# Patient Record
Sex: MALE | Race: BLACK OR AFRICAN AMERICAN | NOT HISPANIC OR LATINO | Employment: FULL TIME | URBAN - METROPOLITAN AREA
[De-identification: names, ages, dates, MRNs, and addresses within clinical notes are randomized per-mention and may not be internally consistent; named-entity substitution may affect disease eponyms.]

---

## 2018-05-06 ENCOUNTER — HOSPITAL ENCOUNTER (EMERGENCY)
Facility: CLINIC | Age: 23
Discharge: HOME | End: 2018-05-06
Attending: EMERGENCY MEDICINE | Admitting: EMERGENCY MEDICINE
Payer: COMMERCIAL

## 2018-05-06 VITALS
DIASTOLIC BLOOD PRESSURE: 73 MMHG | RESPIRATION RATE: 16 BRPM | OXYGEN SATURATION: 98 % | HEIGHT: 69 IN | TEMPERATURE: 97.9 F | BODY MASS INDEX: 39.99 KG/M2 | WEIGHT: 270 LBS | SYSTOLIC BLOOD PRESSURE: 132 MMHG

## 2018-05-06 DIAGNOSIS — L05.01 PILONIDAL ABSCESS: ICD-10-CM

## 2018-05-06 PROCEDURE — 96361 HYDRATE IV INFUSION ADD-ON: CPT

## 2018-05-06 PROCEDURE — 99285 EMERGENCY DEPT VISIT HI MDM: CPT | Mod: 25

## 2018-05-06 PROCEDURE — 96375 TX/PRO/DX INJ NEW DRUG ADDON: CPT

## 2018-05-06 PROCEDURE — 96376 TX/PRO/DX INJ SAME DRUG ADON: CPT

## 2018-05-06 PROCEDURE — 10080 I&D PILONIDAL CYST SIMPLE: CPT

## 2018-05-06 PROCEDURE — 25000128 H RX IP 250 OP 636: Performed by: EMERGENCY MEDICINE

## 2018-05-06 PROCEDURE — 10060 I&D ABSCESS SIMPLE/SINGLE: CPT

## 2018-05-06 PROCEDURE — 96374 THER/PROPH/DIAG INJ IV PUSH: CPT | Mod: 59

## 2018-05-06 RX ORDER — FENTANYL CITRATE 50 UG/ML
50 INJECTION, SOLUTION INTRAMUSCULAR; INTRAVENOUS ONCE
Status: COMPLETED | OUTPATIENT
Start: 2018-05-06 | End: 2018-05-06

## 2018-05-06 RX ORDER — SODIUM CHLORIDE 9 MG/ML
INJECTION, SOLUTION INTRAVENOUS ONCE
Status: COMPLETED | OUTPATIENT
Start: 2018-05-06 | End: 2018-05-06

## 2018-05-06 RX ORDER — FENTANYL CITRATE 50 UG/ML
100 INJECTION, SOLUTION INTRAMUSCULAR; INTRAVENOUS ONCE
Status: COMPLETED | OUTPATIENT
Start: 2018-05-06 | End: 2018-05-06

## 2018-05-06 RX ORDER — FENTANYL CITRATE 50 UG/ML
INJECTION, SOLUTION INTRAMUSCULAR; INTRAVENOUS
Status: DISCONTINUED
Start: 2018-05-06 | End: 2018-05-06 | Stop reason: HOSPADM

## 2018-05-06 RX ORDER — HYDROCODONE BITARTRATE AND ACETAMINOPHEN 5; 325 MG/1; MG/1
1 TABLET ORAL EVERY 6 HOURS PRN
Qty: 12 TABLET | Refills: 0 | Status: SHIPPED | OUTPATIENT
Start: 2018-05-06 | End: 2024-03-08

## 2018-05-06 RX ADMIN — MIDAZOLAM HYDROCHLORIDE 1 MG: 1 INJECTION, SOLUTION INTRAMUSCULAR; INTRAVENOUS at 09:06

## 2018-05-06 RX ADMIN — FENTANYL CITRATE 50 MCG: 50 INJECTION, SOLUTION INTRAMUSCULAR; INTRAVENOUS at 09:39

## 2018-05-06 RX ADMIN — SODIUM CHLORIDE: 9 INJECTION, SOLUTION INTRAVENOUS at 09:00

## 2018-05-06 RX ADMIN — FENTANYL CITRATE 100 MCG: 50 INJECTION, SOLUTION INTRAMUSCULAR; INTRAVENOUS at 09:02

## 2018-05-06 ASSESSMENT — ENCOUNTER SYMPTOMS
FEVER: 0
WOUND: 1

## 2018-05-06 NOTE — ED AVS SNAPSHOT
Emergency Department    [address] [facility] [address]    Phone:  [phone]    Fax:  [phone]                                       Jasen Marshall   MRN: [id number]    Department:   Emergency Department   Date of Visit:  5/6/2018           After Visit Summary Signature Page     I have received my discharge instructions, and my questions have been answered. I have discussed any challenges I see with this plan with the nurse or doctor.    ..........................................................................................................................................  Patient/Patient Representative Signature      ..........................................................................................................................................  Patient Representative Print Name and Relationship to Patient    ..................................................               ................................................  Date                                            Time    ..........................................................................................................................................  Reviewed by Signature/Title    ...................................................              ..............................................  Date                                                            Time

## 2018-05-06 NOTE — DISCHARGE INSTRUCTIONS
Pilonidal Cyst    A pilonidal cyst is found near the base of the spine (tailbone) or top of the buttocks crease. It may look like a pit or small depression. In some cases, it may have a hollow tunnel (sinus tract) that connects it to the surface of the skin. Normally, a pilonidal cyst does not cause symptoms. But if it becomes infected, it can cause pain and swelling. This sheet tells you more about pilonidal cysts and how they are treated.  What causes a pilonidal cyst and who gets them?  Two main causes are:    Ingrown hairs. This happens when a hair is forced under the skin or when a hair follicle ruptures.    Injury to the area. This can happen from sitting for long periods of time.  These cysts are often diagnosed in people between ages 16 and 26. But people of any age can have a pilonidal cyst. They affect both men and women, but they are more common in men.  Symptoms of a pilonidal cyst infection  A pilonidal cyst does not cause symptoms unless it becomes infected. Once a pilonidal cyst becomes infected, it is called a pilonidal abscess. Infection may cause the following symptoms:    Pain, redness, and swelling of the cyst and area around it    Foul-smelling drainage from the cyst    Fever  Diagnosing a pilonidal cyst  A pilonidal cyst can be diagnosed by how it looks and by its location. Your healthcare provider will examine the suspected cyst to confirm a diagnosis. You will be told if any tests are needed.  Treating a pilonidal cyst infection  Most pilonidal cysts are left alone. But if a cyst becomes infected, treatment is needed. It may include the following:    Incision and drainage. If needed, the cyst is cut open, and pus and other infected material is allowed to drain.    Antibiotic medicines for the infection. Know that medicines do not make the cyst go away, and antibiotics have limited use in treating an abscess. They also won t keep a cyst from becoming infected again.    Hot water soaks. These  can help draw out the infection and ease pain and itching.    Surgery to remove the cyst (excision). This may be done if the infection is severe, does not respond to medicine, or keeps coming back. A surgeon cuts and removes the cyst and the tissue around it. Your healthcare provider can tell you more if this is needed.    Laser hair removalaround the area. This may decrease the frequency of flare-ups.  Preventing infection  A pilonidal cyst can easily become infected. Do the following to help prevent infections:    Keep the cyst and surrounding skin area clean.    Remove hair from the area of the cyst regularly. Ask your healthcare provider about safe hair removal products or procedures.    Avoid sitting in one position for long periods of time. This helps to reduce weight and pressure on your tailbone area. Sitting on a special cushion to relieve pressure on the tailbone may also help. Ask your healthcare provider about where to purchase these cushions.    Avoid tight-fitting clothing to reduce skin irritation around the cyst.  Date Last Reviewed: 2/1/2017 2000-2017 [employer]. [address] [place] [address]. All rights reserved. This information is not intended as a substitute for professional medical care. Always follow your healthcare professional's instructions.          Infected Pilonidal Cyst (Incision & Drainage)  A pilonidal cyst is a swelling that starts under the skin on the sacrum near the tail bone. It may look like a small dimple. It can fill with skin oils, hair, and dead skin cells. It may stay small or grow larger. Because it often has an opening to the surface, it may become infected with normal skin bacteria.  Cause  The cause of pilonidal cysts has been debated since they were first recognized. It may be present at birth and go unnoticed. Injury, rubbing, or skin irritation may also cause pilonidal cysts. It can also be caused by an ingrown hair. Most likely, the  cause is a combination of these things. Because some injury or irritation can lead to pilonidal cysts, it can be more common in people who sit or drive a lot for work.  Symptoms  A pilonidal cyst may be small and painless. If it is inflamed or infected, you may have these symptoms:    Swelling    Irritation or redness    Pain    Drainage  The cyst can swell and drain on its own. The swelling and drainage can come and go.  Treatment  Your pilonidal cyst was drained with a small incision using local anesthesia.  After the incision and drainage, gauze packing may be inserted into the opening. If so, it should be removed in 1 to 2 days. Antibiotics are not required in the treatment of a simple abscess, unless the infection is spreading into the skin around the wound. The wound will take about 1 to 2 weeks to heal depending on the size of the cyst.  Home care  Wound care    Pus may drain from the wound for the first few days. Cover the wound with a clean dry bandage. Change the bandage if it becomes soaked with blood or pus, or if it gets soiled with feces or urine.    Sit in a tub filled with about 6 inches of hot water. Keep the water hot for 10 to15 minutes.    If gauze packing was placed inside the cyst cavity, you may be told to remove it yourself. You may do this in the shower. Once the packing is removed, you should wash the area carefully in the shower once a day. Do this until the skin opening has closed. It is okay to direct the shower spray directly into the opening if this is not too painful.  Medicines    Take acetaminophen or ibuprofen for pain, unless you were given a different pain medicine to use. Talk with your doctor before using these medicines if you have chronic liver or kidney disease or have ever had a stomach ulcer or gastrointestinal bleeding. Also talk with your doctor if you are taking blood thinner medicines.    If you were given antibiotics, take them until they are gone. It is important to  finish the antibiotics even if the wound looks better. This is to make sure the infection has cleared.    Use antibiotic cream or ointment if your healthcare provider tells you to do so.  Prevention  Once this infection has healed, the following may decrease the risk of future infections:    Keep the area of the cyst clean by bathing or showering daily.    Avoid tight-fitting clothing to minimize perspiration and irritation of the skin.    Recurrent pilonidal cysts may be completely removed by surgery. But this can only be done at a time when there is no infection. Ask your doctor for more information.  Follow-up care  Follow up with your healthcare provider, or as advised. If a gauze packing was inserted in your wound, it should be removed in 1 to 2 days. Check your wound every day for the signs listed below.  When to seek medical advice  Call your healthcare provider right away if any of these occur:    Pus continues to come from the cyst for 5 days after the incision    Increasing redness, local pain, or swelling    Fever of 100.4 F (38.0 C) or higher for more than 2 days, or as directed by your healthcare provider  Date Last Reviewed: 12/1/2016 2000-2017 [employer]. [address] [place] [address]. All rights reserved. This information is not intended as a substitute for professional medical care. Always follow your healthcare professional's instructions.          Pilonidal Cyst, Infected (Antibiotic Treatment)  A pilonidal cyst is a swelling that starts under the skin on the sacrum near the tailbone. It may look like a small dimple. It can fill with skin oils, hair, and dead skin cells. It may stay small or grow larger. It may become infected with normal skin bacteria because it often has an opening to the surface.  Causes  The cause of pilonidal cysts has been debated since they were first recognized. A cyst may be present at birth and go unnoticed. Injury, rubbing, or skin  irritation may also cause pilonidal cysts. It can also be caused by an ingrown hair. The cause is most likely a combination of these things. Because some injury or irritation can lead to pilonidal cysts, they can be more common in people who sit or drive a lot for work.  Symptoms  A pilonidal cyst may be small and painless. If it becomes inflamed or infected, you may have these symptoms:    Swelling    Irritation or redness    Pain    Drainage  The cyst can swell and drain on its own. The swelling and drainage can come and go.  Treatment  A limited infection can be treated with antibiotics and home care. You have been given antibiotics to treat your infected pilonidal cyst.  Home care  The following guidelines will help you care for your wound at home:    Sit in a tub filled with about 6 inches of hot water. Keep the water hot for 10 to 15 minutes.    Don't squeeze the pilonidal cyst or stick a needle in it to drain it. This will make the infection worse, or spread it.    Cover the cyst with a pad or something similar to keep it from becoming more irritated, damaged, and painful.  Medicines    Take acetaminophen or ibuprofen for pain, unless you were given a different pain medicine to use. Talk with your doctor before using these medicines if you have chronic liver or kidney disease, or have ever had a stomach ulcer or digestive bleeding. Also talk with your doctor if you are taking blood thinner medicines.    Take the antibiotics that you were prescribed until they are all gone. To make sure the infection is cured, it is important to finish the antibiotics even if the wound looks better.    Use antibiotic cream or ointment if your healthcare provider tells you to.    Preventing future infections  Once this infection has healed, follow these tips to lower the risk for another infection:    Keep the area of the cyst clean by bathing or showering every day.    Don't wear tight-fitting clothing. This will help lessen  sweat and irritation of the skin.    You may need surgery to completely remove the cyst if it keeps coming back. The surgery can only be done when the cyst is not infected. Ask your doctor for more information.    Watch for signs of infection listed below so that treatment may be started early.  Follow-up care  Follow up with your healthcare provider, or as advised. Check your wound every day for the signs listed below.  When to seek medical advice  Call your healthcare provider right away if any of these occur:    Pus coming from the cyst    Increasing local pain, redness, or swelling    Fever of 100.4 F (38.0 C) or higher for more than 2 days, or as directed by your healthcare provider  Date Last Reviewed: 12/1/2016 2000-2017 [employer]. [address] [place] [address], [place] [address]. All rights reserved. This information is not intended as a substitute for professional medical care. Always follow your healthcare professional's instructions.            Please see general surgery this week for consultation  Work today or tomorrow

## 2018-05-06 NOTE — LETTER
May 6, 2018      To Whom It May Concern:      Jasen Marshall was seen in our Emergency Department today, 05/06/18.  I expect his condition to improve over the next few days.  He may return to work/school when improved. May 8    Sincerely,        Boom Cuenca MD

## 2018-05-06 NOTE — ED PROVIDER NOTES
"  History     Chief Complaint:  Wound Check     HPI   Jsaen Marshall is a 22 year old male who presents to the emergency department today for evaluation of a wound check. The patient reports six days ago he reports he developed a small bump and pain between his tailbone and buttocks. Over the next few days, the area got larger and the pain worsened. He has his grandmother look at it two days ago, but she just \"put a band aid on it.\" However, the pain has been worsening over the past two days. He tried using a heating pad and the area \"burst open.\" His father then squeezed the area and noted dark colored blood. The patient \"couldn't handle the pain anymore,\" therefore, prompting the visit to the ED for further evaluation. Upon presentation, patient states he had a similar abscess about six years ago in which his grandmother squeezed with improvement and did not see a doctor. The patient has no other concerns at this time.     Allergies:  No Known Drug Allergies     Medications:    raltegravir (ISENTRESS) 400 MG tablet  emtricitabine-tenofovir (TRUVADA) 200-300 MG per tablet    Past Medical History:    History reviewed. No pertinent past medical history.    Past Surgical History:    History reviewed. No pertinent surgical history.    Family History:    History reviewed. No pertinent family history.     Social History:  The patient was accompanied to the ED by family.  Smoking Status: Never  Smokeless Tobacco: Never  Alcohol Use: Yes  Marital Status:  Single      Review of Systems   Constitutional: Negative for fever.   Musculoskeletal:        Tailbone pain   Skin: Positive for wound (pilonidal).   All other systems reviewed and are negative.    Physical Exam   First Vitals:  BP: 143/82  Heart Rate: 89  Temp: 97.9  F (36.6  C)  Resp: 16  Height: 175.3 cm (5' 9\")  Weight: 122.5 kg (270 lb)  SpO2: 96 %    Physical Exam  General: Resting comfortably on the gurney  Head:  The scalp, face, and head appear normal  Eyes:  The " pupils are normal    Conjunctivae and sclera appear normal  ENT:    The nose is normal    Ears/pinnae are normal  Neck:  Normal range of motion  Skin:  No rash or lesions noted.    There is a pilonidal cyst abscess in the superior gluteal cleft    Worse on the right than left    There is a focal area that is currently draining serosanguinous discharge   Neuro: Speech is normal and fluent  Psych:  Awake. Alert.  Normal affect.      Appropriate interactions    Emergency Department Course     Procedures:    Procedure: Incision and Drainage     LOCATIONS:  Pilonidal     ANESTHESIA:  Local field block using Marcaine 0.5% with epinephrine, total of 10 mLs    PREPARATION:  Cleansed with Betadine    PROCEDURE:  Area was incised with # 11 Blade (Sharp Point) with a Single Straight incision.  Wound treatment included Deloculation, Purulent Drainage and stretching.  Packing consisted of new gauze.  Appropriate dressing was applied to cover the area.    Patient Status: Patient tolerated the procedure well. There were no complications.    Interventions:  0900 NS Infusion 125mL/hr IV  0902 Fentanyl 100mcg IV  0906 Versed 1mg IV  0939 Fentanyl 50mcg IV     Emergency Department Course:  Nursing notes and vitals reviewed.  0847: I performed an exam of the patient as documented above.   0933: Patient rechecked and above procedure performed.   Findings and plan explained to the Patient. Patient discharged home with instructions regarding supportive care, medications, and reasons to return. The importance of close follow-up was reviewed. The patient was prescribed Augmentin and norco.     Impression & Plan      Medical Decision Making:  As patient presents with increasing pain in the superior gluteal cleft consistent with a pilonidal cyst abscess right greater than left.  It underwent incision and drainage there was some infection of the cyst.  There is no surrounding cellulitis.  Incision and drainage was performed and this did reveal  a significant cyst cavity.  Packing was performed.  Formal surgical excision will be required since this is the second time this is occurred.    Diagnosis:    ICD-10-CM    1. Pilonidal abscess L05.01      Disposition:  Discharged to home    Discharge Medications:  New Prescriptions    AMOXICILLIN-CLAVULANATE (AUGMENTIN) 875-125 MG PER TABLET    Take 1 tablet by mouth 2 times daily for 10 days    HYDROCODONE-ACETAMINOPHEN (NORCO) 5-325 MG PER TABLET    Take 1 tablet by mouth every 6 hours as needed for pain maximum 10 tablet(s) per day       Scribe Disclosure:  Ginette CUMMINS, am serving as a scribe at 8:38 AM on 5/6/2018 to document services personally performed by Boom Cuenca MD based on my observations and the provider's statements to me.     5/6/2018    EMERGENCY DEPARTMENT       Boom Cuenca MD  05/06/18 1400

## 2018-05-06 NOTE — ED AVS SNAPSHOT
Emergency Department    [address] [place] [address]    Phone:  [phone]    Fax:  [phone]                                       Jasen Marshall   MRN: [id number]    Department:   Emergency Department   Date of Visit:  5/6/2018           Patient Information     Date Of Birth          1995        Your diagnoses for this visit were:     Pilonidal abscess        You were seen by Boom Cuenca MD.      Follow-up Information     Follow up with Boom Mercer MD. Schedule an appointment as soon as possible for a visit in 1 week.    Specialty:  Surgery    Contact information:    [address] [place] [address]  [phone]          Discharge Instructions         Pilonidal Cyst    A pilonidal cyst is found near the base of the spine (tailbone) or top of the buttocks crease. It may look like a pit or small depression. In some cases, it may have a hollow tunnel (sinus tract) that connects it to the surface of the skin. Normally, a pilonidal cyst does not cause symptoms. But if it becomes infected, it can cause pain and swelling. This sheet tells you more about pilonidal cysts and how they are treated.  What causes a pilonidal cyst and who gets them?  Two main causes are:    Ingrown hairs. This happens when a hair is forced under the skin or when a hair follicle ruptures.    Injury to the area. This can happen from sitting for long periods of time.  These cysts are often diagnosed in people between ages 16 and 26. But people of any age can have a pilonidal cyst. They affect both men and women, but they are more common in men.  Symptoms of a pilonidal cyst infection  A pilonidal cyst does not cause symptoms unless it becomes infected. Once a pilonidal cyst becomes infected, it is called a pilonidal abscess. Infection may cause the following symptoms:    Pain, redness, and swelling of the cyst and area around it    Foul-smelling drainage from the cyst    Fever  Diagnosing a  pilonidal cyst  A pilonidal cyst can be diagnosed by how it looks and by its location. Your healthcare provider will examine the suspected cyst to confirm a diagnosis. You will be told if any tests are needed.  Treating a pilonidal cyst infection  Most pilonidal cysts are left alone. But if a cyst becomes infected, treatment is needed. It may include the following:    Incision and drainage. If needed, the cyst is cut open, and pus and other infected material is allowed to drain.    Antibiotic medicines for the infection. Know that medicines do not make the cyst go away, and antibiotics have limited use in treating an abscess. They also won t keep a cyst from becoming infected again.    Hot water soaks. These can help draw out the infection and ease pain and itching.    Surgery to remove the cyst (excision). This may be done if the infection is severe, does not respond to medicine, or keeps coming back. A surgeon cuts and removes the cyst and the tissue around it. Your healthcare provider can tell you more if this is needed.    Laser hair removalaround the area. This may decrease the frequency of flare-ups.  Preventing infection  A pilonidal cyst can easily become infected. Do the following to help prevent infections:    Keep the cyst and surrounding skin area clean.    Remove hair from the area of the cyst regularly. Ask your healthcare provider about safe hair removal products or procedures.    Avoid sitting in one position for long periods of time. This helps to reduce weight and pressure on your tailbone area. Sitting on a special cushion to relieve pressure on the tailbone may also help. Ask your healthcare provider about where to purchase these cushions.    Avoid tight-fitting clothing to reduce skin irritation around the cyst.  Date Last Reviewed: 2/1/2017 2000-2017 [employer]. [address] [place] [address], [place] [address]. All rights reserved. This information is not intended as a substitute for  professional medical care. Always follow your healthcare professional's instructions.          Infected Pilonidal Cyst (Incision & Drainage)  A pilonidal cyst is a swelling that starts under the skin on the sacrum near the tail bone. It may look like a small dimple. It can fill with skin oils, hair, and dead skin cells. It may stay small or grow larger. Because it often has an opening to the surface, it may become infected with normal skin bacteria.  Cause  The cause of pilonidal cysts has been debated since they were first recognized. It may be present at birth and go unnoticed. Injury, rubbing, or skin irritation may also cause pilonidal cysts. It can also be caused by an ingrown hair. Most likely, the cause is a combination of these things. Because some injury or irritation can lead to pilonidal cysts, it can be more common in people who sit or drive a lot for work.  Symptoms  A pilonidal cyst may be small and painless. If it is inflamed or infected, you may have these symptoms:    Swelling    Irritation or redness    Pain    Drainage  The cyst can swell and drain on its own. The swelling and drainage can come and go.  Treatment  Your pilonidal cyst was drained with a small incision using local anesthesia.  After the incision and drainage, gauze packing may be inserted into the opening. If so, it should be removed in 1 to 2 days. Antibiotics are not required in the treatment of a simple abscess, unless the infection is spreading into the skin around the wound. The wound will take about 1 to 2 weeks to heal depending on the size of the cyst.  Home care  Wound care    Pus may drain from the wound for the first few days. Cover the wound with a clean dry bandage. Change the bandage if it becomes soaked with blood or pus, or if it gets soiled with feces or urine.    Sit in a tub filled with about 6 inches of hot water. Keep the water hot for 10 to15 minutes.    If gauze packing was placed inside the cyst cavity, you  may be told to remove it yourself. You may do this in the shower. Once the packing is removed, you should wash the area carefully in the shower once a day. Do this until the skin opening has closed. It is okay to direct the shower spray directly into the opening if this is not too painful.  Medicines    Take acetaminophen or ibuprofen for pain, unless you were given a different pain medicine to use. Talk with your doctor before using these medicines if you have chronic liver or kidney disease or have ever had a stomach ulcer or gastrointestinal bleeding. Also talk with your doctor if you are taking blood thinner medicines.    If you were given antibiotics, take them until they are gone. It is important to finish the antibiotics even if the wound looks better. This is to make sure the infection has cleared.    Use antibiotic cream or ointment if your healthcare provider tells you to do so.  Prevention  Once this infection has healed, the following may decrease the risk of future infections:    Keep the area of the cyst clean by bathing or showering daily.    Avoid tight-fitting clothing to minimize perspiration and irritation of the skin.    Recurrent pilonidal cysts may be completely removed by surgery. But this can only be done at a time when there is no infection. Ask your doctor for more information.  Follow-up care  Follow up with your healthcare provider, or as advised. If a gauze packing was inserted in your wound, it should be removed in 1 to 2 days. Check your wound every day for the signs listed below.  When to seek medical advice  Call your healthcare provider right away if any of these occur:    Pus continues to come from the cyst for 5 days after the incision    Increasing redness, local pain, or swelling    Fever of 100.4 F (38.0 C) or higher for more than 2 days, or as directed by your healthcare provider  Date Last Reviewed: 12/1/2016 2000-2017 [employer]. [address] [facility],  [place] [address]. All rights reserved. This information is not intended as a substitute for professional medical care. Always follow your healthcare professional's instructions.          Pilonidal Cyst, Infected (Antibiotic Treatment)  A pilonidal cyst is a swelling that starts under the skin on the sacrum near the tailbone. It may look like a small dimple. It can fill with skin oils, hair, and dead skin cells. It may stay small or grow larger. It may become infected with normal skin bacteria because it often has an opening to the surface.  Causes  The cause of pilonidal cysts has been debated since they were first recognized. A cyst may be present at birth and go unnoticed. Injury, rubbing, or skin irritation may also cause pilonidal cysts. It can also be caused by an ingrown hair. The cause is most likely a combination of these things. Because some injury or irritation can lead to pilonidal cysts, they can be more common in people who sit or drive a lot for work.  Symptoms  A pilonidal cyst may be small and painless. If it becomes inflamed or infected, you may have these symptoms:    Swelling    Irritation or redness    Pain    Drainage  The cyst can swell and drain on its own. The swelling and drainage can come and go.  Treatment  A limited infection can be treated with antibiotics and home care. You have been given antibiotics to treat your infected pilonidal cyst.  Home care  The following guidelines will help you care for your wound at home:    Sit in a tub filled with about 6 inches of hot water. Keep the water hot for 10 to 15 minutes.    Don't squeeze the pilonidal cyst or stick a needle in it to drain it. This will make the infection worse, or spread it.    Cover the cyst with a pad or something similar to keep it from becoming more irritated, damaged, and painful.  Medicines    Take acetaminophen or ibuprofen for pain, unless you were given a different pain medicine to use. Talk with your doctor before  using these medicines if you have chronic liver or kidney disease, or have ever had a stomach ulcer or digestive bleeding. Also talk with your doctor if you are taking blood thinner medicines.    Take the antibiotics that you were prescribed until they are all gone. To make sure the infection is cured, it is important to finish the antibiotics even if the wound looks better.    Use antibiotic cream or ointment if your healthcare provider tells you to.    Preventing future infections  Once this infection has healed, follow these tips to lower the risk for another infection:    Keep the area of the cyst clean by bathing or showering every day.    Don't wear tight-fitting clothing. This will help lessen sweat and irritation of the skin.    You may need surgery to completely remove the cyst if it keeps coming back. The surgery can only be done when the cyst is not infected. Ask your doctor for more information.    Watch for signs of infection listed below so that treatment may be started early.  Follow-up care  Follow up with your healthcare provider, or as advised. Check your wound every day for the signs listed below.  When to seek medical advice  Call your healthcare provider right away if any of these occur:    Pus coming from the cyst    Increasing local pain, redness, or swelling    Fever of 100.4 F (38.0 C) or higher for more than 2 days, or as directed by your healthcare provider  Date Last Reviewed: 12/1/2016 2000-2017 [employer]. [address] [place] [address], [place] [id number]. All rights reserved. This information is not intended as a substitute for professional medical care. Always follow your healthcare professional's instructions.            Please see general surgery this week for consultation  Work today or tomorrow    24 Hour Appointment Hotline       To make an appointment at any [facility], call [phone] ([phone]). If you don't have a family doctor or clinic, we will help  you find one. [facility] are conveniently located to serve the needs of you and your family.             Review of your medicines      START taking        Dose / Directions Last dose taken    amoxicillin-clavulanate 875-125 MG per tablet   Commonly known as:  AUGMENTIN   Dose:  1 tablet   Quantity:  20 tablet        Take 1 tablet by mouth 2 times daily for 10 days   Refills:  0        HYDROcodone-acetaminophen 5-325 MG per tablet   Commonly known as:  NORCO   Dose:  1 tablet   Quantity:  12 tablet        Take 1 tablet by mouth every 6 hours as needed for pain maximum 10 tablet(s) per day   Refills:  0          Our records show that you are taking the medicines listed below. If these are incorrect, please call your family doctor or clinic.        Dose / Directions Last dose taken    ADVIL PO   Dose:  400 mg        Take 400 mg by mouth   Refills:  0        emtricitabine-tenofovir 200-300 MG per tablet   Commonly known as:  TRUVADA   Dose:  1 tablet   Quantity:  28 tablet        Take 1 tablet by mouth daily for 28 days   Refills:  0        raltegravir 400 MG tablet   Commonly known as:  ISENTRESS   Dose:  400 mg   Quantity:  56 tablet        Take 1 tablet (400 mg) by mouth 2 times daily for 28 days   Refills:  0        UNABLE TO FIND        MEDICATION NAME: pepto bismal   Refills:  0                Information about OPIOIDS     PRESCRIPTION OPIOIDS: WHAT YOU NEED TO KNOW   You have a prescription for an opioid (narcotic) pain medicine. Opioids can cause addiction. If you have a history of chemical dependency of any type, you are at a higher risk of becoming addicted to opioids. Only take this medicine after all other options have been tried. Take it for as short a time and as few doses as possible.     Do not:    Drive. If you drive while taking these medicines, you could be arrested for driving under the influence (DUI).    Operate heavy machinery    Do any other dangerous activities while taking these  medicines.     Drink any alcohol while taking these medicines.      Take with any other medicines that contain acetaminophen. Read all labels carefully. Look for the word  acetaminophen  or  Tylenol.  Ask your pharmacist if you have questions or are unsure.    Store your pills in a secure place, locked if possible. We will not replace any lost or stolen medicine. If you don t finish your medicine, please throw away (dispose) as directed by your pharmacist. The [place] [employer] has more information about safe disposal: https://www.pca.state.[place]/living-green/managing-unwanted-medications    All opioids tend to cause constipation. Drink plenty of water and eat foods that have a lot of fiber, such as fruits, vegetables, prune juice, apple juice and high-fiber cereal. Take a laxative (Miralax, milk of magnesia, Colace, Senna) if you don t move your bowels at least every other day.         Prescriptions were sent or printed at these locations (2 Prescriptions)                   Other Prescriptions                Printed at Department/Unit printer (2 of 2)         amoxicillin-clavulanate (AUGMENTIN) 875-125 MG per tablet               HYDROcodone-acetaminophen (NORCO) 5-325 MG per tablet                Orders Needing Specimen Collection     None      Pending Results     No orders found from 5/4/2018 to 5/7/2018.            Pending Culture Results     No orders found from 5/4/2018 to 5/7/2018.            Pending Results Instructions     If you had any lab results that were not finalized at the time of your Discharge, you can call the ED Lab Result RN at [phone]. You will be contacted by this team for any positive Lab results or changes in treatment. The nurses are available 7 days a week from 10A to 6:30P.  You can leave a message 24 hours per day and they will return your call.        Test Results From Your Hospital Stay               Clinical Quality Measure: Blood Pressure Screening     Your  "blood pressure was checked while you were in the emergency department today. The last reading we obtained was  BP: 109/65 . Please read the guidelines below about what these numbers mean and what you should do about them.  If your systolic blood pressure (the top number) is less than 120 and your diastolic blood pressure (the bottom number) is less than 80, then your blood pressure is normal. There is nothing more that you need to do about it.  If your systolic blood pressure (the top number) is 120-139 or your diastolic blood pressure (the bottom number) is 80-89, your blood pressure may be higher than it should be. You should have your blood pressure rechecked within a year by a primary care provider.  If your systolic blood pressure (the top number) is 140 or greater or your diastolic blood pressure (the bottom number) is 90 or greater, you may have high blood pressure. High blood pressure is treatable, but if left untreated over time it can put you at risk for heart attack, stroke, or kidney failure. You should have your blood pressure rechecked by a primary care provider within the next 4 weeks.  If your provider in the emergency department today gave you specific instructions to follow-up with your doctor or provider even sooner than that, you should follow that instruction and not wait for up to 4 weeks for your follow-up visit.        Thank you for choosing [facility]       Thank you for choosing [facility] for your care. Our goal is always to provide you with excellent care. Hearing back from our patients is one way we can continue to improve our services. Please take a few minutes to complete the written survey that you may receive in the mail after you visit with us. Thank you!        [employer] Information     [employer] lets you send messages to your doctor, view your test results, renew your prescriptions, schedule appointments and more. To sign up, go to [email or web address] . Click on \"Log in\" on the left " "side of the screen, which will take you to the Welcome page. Then click on \"Sign up Now\" on the right side of the page.     You will be asked to enter the access code listed below, as well as some personal information. Please follow the directions to create your username and password.     Your access code is: [email or web address]  Expires: 2018 10:06 AM     Your access code will  in 90 days. If you need help or a new code, please call your [facility] clinic or [phone].        Care EveryWhere ID     This is your Care EveryWhere ID. This could be used by other organizations to access your [facility] medical records  [id number]        Equal Access to Services     DAVID FOURNIER : Rashida Li, tavo barcenas, hany pritchett, minh her. So [facility] [phone].    ATENCIÓN: Si habla español, tiene a chapman disposición servicios gratuitos de asistencia lingüística. Llame al [phone].    We comply with applicable federal civil rights laws and [place] laws. We do not discriminate on the basis of race, color, national origin, age, disability, sex, sexual orientation, or gender identity.            After Visit Summary       This is your record. Keep this with you and show to your community pharmacist(s) and doctor(s) at your next visit.                  "

## 2018-05-10 ENCOUNTER — HOSPITAL ENCOUNTER (EMERGENCY)
Facility: CLINIC | Age: 23
Discharge: HOME | End: 2018-05-10
Attending: EMERGENCY MEDICINE | Admitting: EMERGENCY MEDICINE
Payer: COMMERCIAL

## 2018-05-10 VITALS
OXYGEN SATURATION: 99 % | TEMPERATURE: 98.2 F | HEIGHT: 69 IN | SYSTOLIC BLOOD PRESSURE: 122 MMHG | RESPIRATION RATE: 16 BRPM | BODY MASS INDEX: 37.59 KG/M2 | HEART RATE: 87 BPM | WEIGHT: 253.8 LBS | DIASTOLIC BLOOD PRESSURE: 63 MMHG

## 2018-05-10 DIAGNOSIS — R73.9 HYPERGLYCEMIA: ICD-10-CM

## 2018-05-10 LAB
ALBUMIN UR-MCNC: 30 MG/DL
ANION GAP SERPL CALCULATED.3IONS-SCNC: 9 MMOL/L (ref 3–14)
APPEARANCE UR: CLEAR
BASOPHILS # BLD AUTO: 0 10E9/L (ref 0–0.2)
BASOPHILS NFR BLD AUTO: 0.2 %
BILIRUB UR QL STRIP: NEGATIVE
BUN SERPL-MCNC: 14 MG/DL (ref 7–30)
CALCIUM SERPL-MCNC: 10 MG/DL (ref 8.5–10.1)
CHLORIDE SERPL-SCNC: 97 MMOL/L (ref 94–109)
CO2 SERPL-SCNC: 27 MMOL/L (ref 20–32)
COLOR UR AUTO: ABNORMAL
CREAT SERPL-MCNC: 0.72 MG/DL (ref 0.66–1.25)
DIFFERENTIAL METHOD BLD: NORMAL
EOSINOPHIL # BLD AUTO: 0.3 10E9/L (ref 0–0.7)
EOSINOPHIL NFR BLD AUTO: 3.5 %
ERYTHROCYTE [DISTWIDTH] IN BLOOD BY AUTOMATED COUNT: 12 % (ref 10–15)
GFR SERPL CREATININE-BSD FRML MDRD: >90 ML/MIN/1.7M2
GLUCOSE BLDC GLUCOMTR-MCNC: 413 MG/DL (ref 70–99)
GLUCOSE BLDC GLUCOMTR-MCNC: 464 MG/DL (ref 70–99)
GLUCOSE SERPL-MCNC: 499 MG/DL (ref 70–99)
GLUCOSE UR STRIP-MCNC: >1000 MG/DL
HBA1C MFR BLD: 13.1 % (ref 0–5.6)
HCT VFR BLD AUTO: 42.8 % (ref 40–53)
HGB BLD-MCNC: 15.5 G/DL (ref 13.3–17.7)
HGB UR QL STRIP: NEGATIVE
IMM GRANULOCYTES # BLD: 0.1 10E9/L (ref 0–0.4)
IMM GRANULOCYTES NFR BLD: 0.6 %
KETONES UR STRIP-MCNC: 40 MG/DL
LEUKOCYTE ESTERASE UR QL STRIP: NEGATIVE
LYMPHOCYTES # BLD AUTO: 3.7 10E9/L (ref 0.8–5.3)
LYMPHOCYTES NFR BLD AUTO: 43.1 %
MCH RBC QN AUTO: 30.8 PG (ref 26.5–33)
MCHC RBC AUTO-ENTMCNC: 36.2 G/DL (ref 31.5–36.5)
MCV RBC AUTO: 85 FL (ref 78–100)
MONOCYTES # BLD AUTO: 0.8 10E9/L (ref 0–1.3)
MONOCYTES NFR BLD AUTO: 9.5 %
MUCOUS THREADS #/AREA URNS LPF: PRESENT /LPF
NEUTROPHILS # BLD AUTO: 3.7 10E9/L (ref 1.6–8.3)
NEUTROPHILS NFR BLD AUTO: 43.1 %
NITRATE UR QL: NEGATIVE
NRBC # BLD AUTO: 0 10*3/UL
NRBC BLD AUTO-RTO: 0 /100
PH UR STRIP: 5 PH (ref 5–7)
PLATELET # BLD AUTO: 238 10E9/L (ref 150–450)
POTASSIUM SERPL-SCNC: 4.3 MMOL/L (ref 3.4–5.3)
RBC # BLD AUTO: 5.03 10E12/L (ref 4.4–5.9)
RBC #/AREA URNS AUTO: <1 /HPF (ref 0–2)
SODIUM SERPL-SCNC: 133 MMOL/L (ref 133–144)
SOURCE: ABNORMAL
SP GR UR STRIP: 1.03 (ref 1–1.03)
UROBILINOGEN UR STRIP-MCNC: NORMAL MG/DL (ref 0–2)
WBC # BLD AUTO: 8.5 10E9/L (ref 4–11)
WBC #/AREA URNS AUTO: 1 /HPF (ref 0–5)

## 2018-05-10 PROCEDURE — 96361 HYDRATE IV INFUSION ADD-ON: CPT

## 2018-05-10 PROCEDURE — 25000132 ZZH RX MED GY IP 250 OP 250 PS 637: Performed by: PHYSICIAN ASSISTANT

## 2018-05-10 PROCEDURE — 00000146 ZZHCL STATISTIC GLUCOSE BY METER IP

## 2018-05-10 PROCEDURE — 25000128 H RX IP 250 OP 636: Performed by: PHYSICIAN ASSISTANT

## 2018-05-10 PROCEDURE — 25000131 ZZH RX MED GY IP 250 OP 636 PS 637: Performed by: PHYSICIAN ASSISTANT

## 2018-05-10 PROCEDURE — 96374 THER/PROPH/DIAG INJ IV PUSH: CPT

## 2018-05-10 PROCEDURE — 83036 HEMOGLOBIN GLYCOSYLATED A1C: CPT | Performed by: PHYSICIAN ASSISTANT

## 2018-05-10 PROCEDURE — 81001 URINALYSIS AUTO W/SCOPE: CPT | Performed by: PHYSICIAN ASSISTANT

## 2018-05-10 PROCEDURE — 80048 BASIC METABOLIC PNL TOTAL CA: CPT | Performed by: PHYSICIAN ASSISTANT

## 2018-05-10 PROCEDURE — 85025 COMPLETE CBC W/AUTO DIFF WBC: CPT | Performed by: PHYSICIAN ASSISTANT

## 2018-05-10 PROCEDURE — 99284 EMERGENCY DEPT VISIT MOD MDM: CPT | Mod: 25

## 2018-05-10 RX ORDER — SODIUM CHLORIDE, SODIUM LACTATE, POTASSIUM CHLORIDE, CALCIUM CHLORIDE 600; 310; 30; 20 MG/100ML; MG/100ML; MG/100ML; MG/100ML
1000 INJECTION, SOLUTION INTRAVENOUS CONTINUOUS
Status: DISCONTINUED | OUTPATIENT
Start: 2018-05-10 | End: 2018-05-10 | Stop reason: HOSPADM

## 2018-05-10 RX ADMIN — METFORMIN HYDROCHLORIDE 500 MG: 500 TABLET, FILM COATED ORAL at 18:27

## 2018-05-10 RX ADMIN — SODIUM CHLORIDE, POTASSIUM CHLORIDE, SODIUM LACTATE AND CALCIUM CHLORIDE 1000 ML: 600; 310; 30; 20 INJECTION, SOLUTION INTRAVENOUS at 18:09

## 2018-05-10 RX ADMIN — SODIUM CHLORIDE 10 UNITS: 9 INJECTION, SOLUTION INTRAVENOUS at 18:38

## 2018-05-10 RX ADMIN — SODIUM CHLORIDE, POTASSIUM CHLORIDE, SODIUM LACTATE AND CALCIUM CHLORIDE 1000 ML: 600; 310; 30; 20 INJECTION, SOLUTION INTRAVENOUS at 17:32

## 2018-05-10 ASSESSMENT — ENCOUNTER SYMPTOMS
POLYDIPSIA: 1
VOMITING: 0
HEADACHES: 0
SHORTNESS OF BREATH: 0
ABDOMINAL DISTENTION: 0
NAUSEA: 0
ABDOMINAL PAIN: 0
FATIGUE: 1
CHEST TIGHTNESS: 0
DYSURIA: 0
DIARRHEA: 0
FLANK PAIN: 0
FREQUENCY: 1

## 2018-05-10 NOTE — ED PROVIDER NOTES
History     Chief Complaint:  Hyperglycemia      HPI   Jasen Marshall is a 22 year old male with a family history of type 2 diabetes who presents to the emergency department today for evaluation of hyperglycemia. The patient states that approximately 2 weeks ago he began feeling increasingly thirsty, drinking a lot more water than usual. Additionally, the patient notes that he has been intermittently going through hot flashes. His dad also noticed that he has been more sleepy than usual, and he was also concerned due to the amount of water the patient has been consuming and he subsequently checked his blood sugar today and it was 501. The patient denies any changes in his vision, abdominal pian, nausea, vomiting, or diarrhea. The patient states that he has recently been trying to eat healthier, adding more fruit and cutting out soda in his diet. Th patient states that for breakfast he ate peanut butter toast and for lunch he had a sausage and egg mcmuffin with orange juice.     Of note, the patient has an unrelated pilonidal cyst. He denies any trouble such as pain or drainage with this.       Allergies:  No Known Drug Allergies     Medications:    amoxicillin-clavulanate (AUGMENTIN) 875-125 MG per tablet  HYDROcodone-acetaminophen (NORCO) 5-325 MG per tablet  Ibuprofen (ADVIL PO)    Past Medical History:    History reviewed. No pertinent past medical history.    Past Surgical History:    History reviewed. No pertinent surgical history.    Family History:    Diabetes    Social History:  The patient was accompanied to the ED by father.  Smoking Status: negative  Smokeless Tobacco: negative  Alcohol Use: positive  Marital Status:  Single [1]    Review of Systems   Constitutional: Positive for fatigue.   HENT: Negative for congestion.    Eyes: Negative for visual disturbance.   Respiratory: Negative for chest tightness and shortness of breath.    Cardiovascular: Negative for chest pain.   Gastrointestinal: Negative for  "abdominal distention, abdominal pain, diarrhea, nausea and vomiting.   Endocrine: Positive for polydipsia and polyuria.   Genitourinary: Positive for frequency. Negative for dysuria and flank pain.   Skin: Negative for rash.   Neurological: Negative for headaches.   All other systems reviewed and are negative.    Physical Exam     Patient Vitals for the past 24 hrs:   BP Temp Temp src Pulse Resp SpO2 Height Weight   05/10/18 1524 128/75 97.4  F (36.3  C) Oral 87 16 98 % 1.753 m (5' 9\") 115.1 kg (253 lb 12.8 oz)     Physical Exam    General: Well-nourished, no acute distress  Eyes: PERRL, conjunctivae pink no scleral icterus or conjunctival injection  ENT:  Moist mucus membranes  Respiratory:  No respiratory distress, lungs clear throughout no wheezes, crackles or rales.   CV: Normal rate, no murmurs, rubs or gallops  GI: Obese, non tender, non distended, normal bowel sounds   Skin: Warm, dry.  No rashes or petechiae  Musculoskeletal: No peripheral edema or calf tenderness  Neuro: Alert and oriented to person/place/time  Psychiatric: Normal affect      Emergency Department Course   Laboratory:  CBC: AWNL. (WBC 8.5, HGB 15.5, )   CMP: Glucose 499 (H) o/w WNL. (Creatinine: 0.72)  Hemoglobin A1c: 13.1 (H)  1540 Glucose by meter: 464 (H)  UA with Microscopic: glucose > 1000, 40 urine ketones (A), Protein albumin 30 (A), Mucous present (A)    Interventions:  1732 Normal Saline 1,000 mL IV  1809 lactated ringers bolus ,1000 mL IV  1827 Metformin 500 mg Oral  1838 Insulin 10 units, IV    Emergency Department Course:  Nursing notes and vitals reviewed. I performed an exam of the patient as documented above.    Blood drawn. This was sent to the lab for further testing, results above.    The patient provided a urine sample here in the emergency department. This was sent for laboratory testing, findings above.     Findings and plan explained to the Patient. Patient discharged home with instructions regarding supportive " care, medications, and reasons to return. The importance of close follow-up was reviewed.       Impression & Plan    Medical Decision Making:    Jasen Marshall is a 22 year old male who presents for evaluation of elevated blood sugar of 499.  By blood work there is no signs of DKA, no clinical features to suggest hyperosmolar issues. He has multiple family members with type 2 diabetes, but no known personal history. He was given Metformin 500 mg and 10 units isulin in the ED. Prior to discharge his Blood glucose was 413.  UA was significant for urine glucose greater than 1000 and urine ketones of 40, CBC unremarkable BMP unremarkable other than elevated glucose, hemoglobin A1c 13.1.  Patient is currently asymptomatic and vitally stable and ready to be discharged. Patient will be discharged with a short supply of metformin. Discussed follow-up in detail with patient and they agree.  See primary ASAP.          Diagnosis:    ICD-10-CM    1. Hyperglycemia R73.9 UA with Microscopic       Disposition:  discharged to home, with PCP follow up tomorrow     Discharge Medications:  New Prescriptions    METFORMIN (GLUCOPHAGE) 500 MG TABLET    Take 1 tablet (500 mg) by mouth 2 times daily (with meals)       IAngelita, am serving as a scribe on 5/10/2018 at 5:07 PM to personally document services performed by Jose Christine PA-C, * based on my observations and the provider's statements to me.     Angelita Carson  5/10/2018    EMERGENCY DEPARTMENT       Jose Christine PA-C  05/10/18 2050

## 2018-05-10 NOTE — ED NOTES
Emergency Department Attending Supervision Note  5/10/2018  5:22 PM      I evaluated this patient in conjunction with Jose Christine PA-C.      Briefly, the patient presented with hyperglycemia. Father checked blood sugar at home today as he noticed the patient wsa drinking more water and it was read as high. No history of type 2 diabetes.      Of note, the patient has a pilonidal cyst that was seen in ED recently. Still on antibiotics but improving cyst.     On my exam,   General: Resting comfortably on the gurney  Eyes:  The pupils are equal and round    Conjunctivae and sclerae are normal  ENT:    Moist mucous membranes  Neck:  Normal range of motion  CV:  Regular rate and rhythm    Skin warm and well perfused   Resp:  Lungs are clear    Non-labored    No rales    No wheezing   MS:  Normal muscular tone  Skin:  No rash or acute skin lesions noted  Neuro:   Awake, alert.      Speech is normal and fluent.    Face is symmetric.     Moves all extremities  Psych:  Normal affect.  Appropriate interactions.    Results:    Laboratory:  CBC: AWNL. (WBC 8.5, HGB 15.5, )   CMP: Glucose 499 (H) o/w WNL. (Creatinine: 0.72)  Hemoglobin A1c: 13.1 (H)  1540 Glucose by meter: 464 (H)  UA with Microscopic: glucose > 1000, 40 urine ketones (A), Protein albumin 30 (A), Mucous present (A)    Interventions:  1732 Normal Saline 1,000 mL IV  1809 lactated ringers bolus ,1000 mL IV  1827 Metformin 500 mg Oral  1838 Insulin 10 units, IV    ED course:    Labs were obtained as shown above. No evidence of DKA/HHS. The patient was given Metformin, IV fluids and Insulin to treat his hyperglycemia.     My impression is new onset type 2 diabetes. Hgb A1c very high. Has follow-up with primary clinic tomorrow. He can discuss new onset of type 2 diabetes with PCP tomorrow. Did give limited prescription for metformin to patient. No indication for hospitalization.    Diagnosis    ICD-10-CM    1. Hyperglycemia R73.9 UA with Microscopic        Scribe Disclosure:  I, Angelita Alli, am serving as a scribe on 5/10/2018 at 5:15 PM to personally document services performed by Lauren Lindsey MD based on my observations and the provider's statements to me.        Lauren Lindsey MD  05/10/18 3618

## 2018-05-10 NOTE — DISCHARGE INSTRUCTIONS
Preventing Diabetes  What is diabetes?  Diabetes is a disease that causes high blood sugar. Over time, high blood sugar can lead to a number of health problems.  You are at risk for diabetes if you:    Are overweight    Don't get enough exercise    Have a parent, brother or sister with diabetes    Are , , ,  American or     Have high blood pressure (over 130/80)    Have low HDL cholesterol (35 or lower)    Have high triglycerides (150 or higher)    Are over age 45    Have had a baby weighing more than 9 pounds    Have had gestational diabetes (diabetes during pregnancy)    Have PCOS (polycystic ovary syndrome).  If you are at risk of getting diabetes, you can take steps now to prevent or delay its onset.  How can I reduce my risk of getting diabetes?  Follow the steps below. These can reduce your risk for diabetes by up to 60%.  1. Exercise 30 minutes a day, at least five times per week (or 150 minutes of exercise per week).  2. Lose weight if you need to. If you are overweight, losing just 5 to 10% of your body weight (an average of 15 pounds) may reduce your risk.  3. Cut back on the fat and calories in your diet.  You should also see your doctor every year to check for diabetes.  How would I know if I had diabetes?  To check for diabetes, your doctor will do a blood test to measure your blood sugar. You cannot eat or drink anything for several hours before this test.    If your blood sugar is less than 100, you do not have diabetes.    If it is between 100 and 125, you have pre-diabetes.    If it is 126 or higher on two different days, you have diabetes.  The doctor may order more tests to confirm the results.  What is pre-diabetes?  Pre-diabetes is when your blood sugar level is higher than normal, but not high enough to be called diabetes.  Pre-diabetes will usually turn into diabetes in a short time if you do not change your health habits.  Damage to  your body, especially the heart and blood vessels, may already be occurring with pre-diabetes. If you have pre-diabetes, it is important to start making healthy choices now.  What should I do if I get diabetes?  If not controlled, diabetes can lead to blindness, kidney failure, nerve damage, heart attack, stroke or amputation (surgery to remove a limb).  You will greatly reduce your risks if you control your diabetes. You may need to change your diet, get more exercise, take medicine and test your blood sugar often.  You will also need to:    Learn as much as you can about diabetes. This will give you the tools you need to build healthy habits. Be sure to attend a diabetes class or meet with a diabetes educator.    Work closely with a doctor who understands diabetes.    Get support from family and friends. Support is vital when you are making many changes in your life.  How can I find a diabetes education program?  [facility] offers complete diabetes education and ongoing care. Call [phone] for details or to schedule a visit.  Or call The American Diabetes Association at 5-379DIABETES and ask for a program near you. You can also check their website at www.diabetes.org.  For informational purposes only. Not to replace the advice of your health care provider.  Copyright   2008 [facility]. All rights reserved. [employer] 276288 - 12/15.

## 2018-05-10 NOTE — ED AVS SNAPSHOT
Emergency Department    [address] [facility] [address]    Phone:  [phone]    Fax:  [phone]                                       Jasen Marshall   MRN: [id number]    Department:   Emergency Department   Date of Visit:  5/10/2018           After Visit Summary Signature Page     I have received my discharge instructions, and my questions have been answered. I have discussed any challenges I see with this plan with the nurse or doctor.    ..........................................................................................................................................  Patient/Patient Representative Signature      ..........................................................................................................................................  Patient Representative Print Name and Relationship to Patient    ..................................................               ................................................  Date                                            Time    ..........................................................................................................................................  Reviewed by Signature/Title    ...................................................              ..............................................  Date                                                            Time

## 2018-05-10 NOTE — ED AVS SNAPSHOT
Emergency Department    [address] [facility] [address]    Phone:  [phone]    Fax:  [phone]                                       Jasen Marshall   MRN: [id number]    Department:   Emergency Department   Date of Visit:  5/10/2018           Patient Information     Date Of Birth          1995        Your diagnoses for this visit were:     Hyperglycemia        You were seen by Lauren Lindsey MD.      Follow-up Information     Follow up with Clinic, [facility] In 1 day.    Contact information:    [address]  [facility] [address]  [phone]          Discharge Instructions       Preventing Diabetes  What is diabetes?  Diabetes is a disease that causes high blood sugar. Over time, high blood sugar can lead to a number of health problems.  You are at risk for diabetes if you:    Are overweight    Don't get enough exercise    Have a parent, brother or sister with diabetes    Are , , ,  American or     Have high blood pressure (over 130/80)    Have low HDL cholesterol (35 or lower)    Have high triglycerides (150 or higher)    Are over age 45    Have had a baby weighing more than 9 pounds    Have had gestational diabetes (diabetes during pregnancy)    Have PCOS (polycystic ovary syndrome).  If you are at risk of getting diabetes, you can take steps now to prevent or delay its onset.  How can I reduce my risk of getting diabetes?  Follow the steps below. These can reduce your risk for diabetes by up to 60%.  1. Exercise 30 minutes a day, at least five times per week (or 150 minutes of exercise per week).  2. Lose weight if you need to. If you are overweight, losing just 5 to 10% of your body weight (an average of 15 pounds) may reduce your risk.  3. Cut back on the fat and calories in your diet.  You should also see your doctor every year to check for diabetes.  How would I know if I had  diabetes?  To check for diabetes, your doctor will do a blood test to measure your blood sugar. You cannot eat or drink anything for several hours before this test.    If your blood sugar is less than 100, you do not have diabetes.    If it is between 100 and 125, you have pre-diabetes.    If it is 126 or higher on two different days, you have diabetes.  The doctor may order more tests to confirm the results.  What is pre-diabetes?  Pre-diabetes is when your blood sugar level is higher than normal, but not high enough to be called diabetes.  Pre-diabetes will usually turn into diabetes in a short time if you do not change your health habits.  Damage to your body, especially the heart and blood vessels, may already be occurring with pre-diabetes. If you have pre-diabetes, it is important to start making healthy choices now.  What should I do if I get diabetes?  If not controlled, diabetes can lead to blindness, kidney failure, nerve damage, heart attack, stroke or amputation (surgery to remove a limb).  You will greatly reduce your risks if you control your diabetes. You may need to change your diet, get more exercise, take medicine and test your blood sugar often.  You will also need to:    Learn as much as you can about diabetes. This will give you the tools you need to build healthy habits. Be sure to attend a diabetes class or meet with a diabetes educator.    Work closely with a doctor who understands diabetes.    Get support from family and friends. Support is vital when you are making many changes in your life.  How can I find a diabetes education program?  [facility] offers complete diabetes education and ongoing care. Call [phone] for details or to schedule a visit.  Or call The American Diabetes Association at 7-467-DIABETES and ask for a program near you. You can also check their website at www.diabetes.org.  For informational purposes only. Not to replace the advice of your health care  provider.  Copyright   2008 [facility]. All rights reserved. [facility] 299215 - 12/15.      24 Hour Appointment Hotline       To make an appointment at any [facility], call [phone] ([phone]). If you don't have a family doctor or clinic, we will help you find one. [facility] are conveniently located to serve the needs of you and your family.             Review of your medicines      START taking        Dose / Directions Last dose taken    metFORMIN 500 MG tablet   Commonly known as:  GLUCOPHAGE   Dose:  500 mg   Quantity:  20 tablet        Take 1 tablet (500 mg) by mouth 2 times daily (with meals)   Refills:  1          Our records show that you are taking the medicines listed below. If these are incorrect, please call your family doctor or clinic.        Dose / Directions Last dose taken    ADVIL PO   Dose:  400 mg        Take 400 mg by mouth   Refills:  0        amoxicillin-clavulanate 875-125 MG per tablet   Commonly known as:  AUGMENTIN   Dose:  1 tablet   Quantity:  20 tablet        Take 1 tablet by mouth 2 times daily for 10 days   Refills:  0        emtricitabine-tenofovir 200-300 MG per tablet   Commonly known as:  TRUVADA   Dose:  1 tablet   Quantity:  28 tablet        Take 1 tablet by mouth daily for 28 days   Refills:  0        HYDROcodone-acetaminophen 5-325 MG per tablet   Commonly known as:  NORCO   Dose:  1 tablet   Quantity:  12 tablet        Take 1 tablet by mouth every 6 hours as needed for pain maximum 10 tablet(s) per day   Refills:  0        raltegravir 400 MG tablet   Commonly known as:  ISENTRESS   Dose:  400 mg   Quantity:  56 tablet        Take 1 tablet (400 mg) by mouth 2 times daily for 28 days   Refills:  0        UNABLE TO FIND        MEDICATION NAME: pepto bismal   Refills:  0                Prescriptions were sent or printed at these locations (1 Prescription)                   Other Prescriptions                Printed at Department/Unit printer  (1 of 1)         metFORMIN (GLUCOPHAGE) 500 MG tablet                Procedures and tests performed during your visit     Basic metabolic panel    CBC with platelets differential    Glucose by meter    Glucose monitor nursing POCT    Hemoglobin A1c    Peripheral IV catheter    UA with Microscopic      Orders Needing Specimen Collection     None      Pending Results     No orders found from 5/8/2018 to 5/11/2018.            Pending Culture Results     No orders found from 5/8/2018 to 5/11/2018.            Pending Results Instructions     If you had any lab results that were not finalized at the time of your Discharge, you can call the ED Lab Result RN at [phone]. You will be contacted by this team for any positive Lab results or changes in treatment. The nurses are available 7 days a week from 10A to 6:30P.  You can leave a message 24 hours per day and they will return your call.        Test Results From Your Hospital Stay        5/10/2018  3:40 PM      Component Results     Component Value Ref Range & Units Status    Glucose 464 (H) 70 - 99 mg/dL Final         5/10/2018  5:24 PM      Component Results     Component Value Ref Range & Units Status    WBC 8.5 4.0 - 11.0 10e9/L Final    RBC Count 5.03 4.4 - 5.9 10e12/L Final    Hemoglobin 15.5 13.3 - 17.7 g/dL Final    Hematocrit 42.8 40.0 - 53.0 % Final    MCV 85 78 - 100 fl Final    MCH 30.8 26.5 - 33.0 pg Final    MCHC 36.2 31.5 - 36.5 g/dL Final    RDW 12.0 10.0 - 15.0 % Final    Platelet Count 238 150 - 450 10e9/L Final    Diff Method Automated Method  Final    % Neutrophils 43.1 % Final    % Lymphocytes 43.1 % Final    % Monocytes 9.5 % Final    % Eosinophils 3.5 % Final    % Basophils 0.2 % Final    % Immature Granulocytes 0.6 % Final    Nucleated RBCs 0 0 /100 Final    Absolute Neutrophil 3.7 1.6 - 8.3 10e9/L Final    Absolute Lymphocytes 3.7 0.8 - 5.3 10e9/L Final    Absolute Monocytes 0.8 0.0 - 1.3 10e9/L Final    Absolute Eosinophils 0.3 0.0 - 0.7 10e9/L  Final    Absolute Basophils 0.0 0.0 - 0.2 10e9/L Final    Abs Immature Granulocytes 0.1 0 - 0.4 10e9/L Final    Absolute Nucleated RBC 0.0  Final         5/10/2018  5:51 PM      Component Results     Component Value Ref Range & Units Status    Sodium 133 133 - 144 mmol/L Final    Potassium 4.3 3.4 - 5.3 mmol/L Final    Specimen slightly hemolyzed, potassium may be falsely elevated    Chloride 97 94 - 109 mmol/L Final    Carbon Dioxide 27 20 - 32 mmol/L Final    Anion Gap 9 3 - 14 mmol/L Final    Glucose 499 (H) 70 - 99 mg/dL Final    Urea Nitrogen 14 7 - 30 mg/dL Final    Creatinine 0.72 0.66 - 1.25 mg/dL Final    GFR Estimate >90 >60 mL/min/1.7m2 Final    Non  GFR Calc    GFR Estimate If Black >90 >60 mL/min/1.7m2 Final    African American GFR Calc    Calcium 10.0 8.5 - 10.1 mg/dL Final         5/10/2018  6:12 PM      Component Results     Component Value Ref Range & Units Status    Color Urine Light Yellow  Final    Appearance Urine Clear  Final    Glucose Urine >1000 (A) NEG^Negative mg/dL Final    Bilirubin Urine Negative NEG^Negative Final    Ketones Urine 40 (A) NEG^Negative mg/dL Final    Specific Gravity Urine 1.033 1.003 - 1.035 Final    Blood Urine Negative NEG^Negative Final    pH Urine 5.0 5.0 - 7.0 pH Final    Protein Albumin Urine 30 (A) NEG^Negative mg/dL Final    Urobilinogen mg/dL Normal 0.0 - 2.0 mg/dL Final    Nitrite Urine Negative NEG^Negative Final    Leukocyte Esterase Urine Negative NEG^Negative Final    Source Midstream Urine  Final    WBC Urine 1 0 - 5 /HPF Final    RBC Urine <1 0 - 2 /HPF Final    Mucous Urine Present (A) NEG^Negative /LPF Final         5/10/2018  5:46 PM      Component Results     Component Value Ref Range & Units Status    Hemoglobin A1C 13.1 (H) 0 - 5.6 % Final    Normal <5.7% Prediabetes 5.7-6.4%  Diabetes 6.5% or higher - adopted from ADA   consensus guidelines.                  Clinical Quality Measure: Blood Pressure Screening     Your blood pressure  "was checked while you were in the emergency department today. The last reading we obtained was  BP: 128/75 . Please read the guidelines below about what these numbers mean and what you should do about them.  If your systolic blood pressure (the top number) is less than 120 and your diastolic blood pressure (the bottom number) is less than 80, then your blood pressure is normal. There is nothing more that you need to do about it.  If your systolic blood pressure (the top number) is 120-139 or your diastolic blood pressure (the bottom number) is 80-89, your blood pressure may be higher than it should be. You should have your blood pressure rechecked within a year by a primary care provider.  If your systolic blood pressure (the top number) is 140 or greater or your diastolic blood pressure (the bottom number) is 90 or greater, you may have high blood pressure. High blood pressure is treatable, but if left untreated over time it can put you at risk for heart attack, stroke, or kidney failure. You should have your blood pressure rechecked by a primary care provider within the next 4 weeks.  If your provider in the emergency department today gave you specific instructions to follow-up with your doctor or provider even sooner than that, you should follow that instruction and not wait for up to 4 weeks for your follow-up visit.        Thank you for choosing [facility]       Thank you for choosing [facility] for your care. Our goal is always to provide you with excellent care. Hearing back from our patients is one way we can continue to improve our services. Please take a few minutes to complete the written survey that you may receive in the mail after you visit with us. Thank you!        [employer] Information     [employer] lets you send messages to your doctor, view your test results, renew your prescriptions, schedule appointments and more. To sign up, go to [email or web address] . Click on \"Log in\" on the left side of the " "screen, which will take you to the Welcome page. Then click on \"Sign up Now\" on the right side of the page.     You will be asked to enter the access code listed below, as well as some personal information. Please follow the directions to create your username and password.     Your access code is: [email or web address]  Expires: 2018 10:06 AM     Your access code will  in 90 days. If you need help or a new code, please call your [facility] clinic or [phone].        Care EveryWhere ID     This is your Care EveryWhere ID. This could be used by other organizations to access your [facility] medical records  [id number]        Equal Access to Services     DAVID FOURNIER : Rashida Li, tavo barcenas, hany pritchett, minh her. So [facility] [phone].    ATENCIÓN: Si habla español, tiene a chapman disposición servicios gratuitos de asistencia lingüística. Llame al [phone].    We comply with applicable federal civil rights laws and [place] laws. We do not discriminate on the basis of race, color, national origin, age, disability, sex, sexual orientation, or gender identity.            After Visit Summary       This is your record. Keep this with you and show to your community pharmacist(s) and doctor(s) at your next visit.                  "

## 2019-04-20 ENCOUNTER — ANCILLARY PROCEDURE (OUTPATIENT)
Dept: GENERAL RADIOLOGY | Facility: CLINIC | Age: 24
End: 2019-04-20
Attending: PHYSICIAN ASSISTANT

## 2019-04-20 ENCOUNTER — OFFICE VISIT (OUTPATIENT)
Dept: URGENT CARE | Facility: URGENT CARE | Age: 24
End: 2019-04-20

## 2019-04-20 VITALS
BODY MASS INDEX: 40.17 KG/M2 | SYSTOLIC BLOOD PRESSURE: 138 MMHG | TEMPERATURE: 98.2 F | OXYGEN SATURATION: 98 % | HEART RATE: 115 BPM | WEIGHT: 272 LBS | DIASTOLIC BLOOD PRESSURE: 60 MMHG

## 2019-04-20 DIAGNOSIS — S93.402A SPRAIN OF LEFT ANKLE, UNSPECIFIED LIGAMENT, INITIAL ENCOUNTER: Primary | ICD-10-CM

## 2019-04-20 DIAGNOSIS — M25.572 PAIN IN JOINT INVOLVING ANKLE AND FOOT, LEFT: ICD-10-CM

## 2019-04-20 DIAGNOSIS — E66.01 MORBID OBESITY (H): ICD-10-CM

## 2019-04-20 PROBLEM — E11.9 DIABETES MELLITUS, TYPE 2 (H): Status: ACTIVE | Noted: 2019-04-20

## 2019-04-20 PROCEDURE — 73610 X-RAY EXAM OF ANKLE: CPT | Mod: LT

## 2019-04-20 PROCEDURE — 99203 OFFICE O/P NEW LOW 30 MIN: CPT | Performed by: PHYSICIAN ASSISTANT

## 2019-04-20 RX ORDER — LISINOPRIL 10 MG/1
10 TABLET ORAL
COMMUNITY
Start: 2018-05-14 | End: 2024-03-08

## 2019-04-21 NOTE — PATIENT INSTRUCTIONS
Patient Education     Treating Ankle Sprains  Treatment will depend on how bad your sprain is. For a severe sprain, healing may take 3 months or more.  Right after your injury: Use R.I.C.E.    Rest: At first, keep weight off the ankle as much as you can. You may be given crutches to help you walk without putting weight on the ankle.    Ice: Put an ice pack on the ankle for 20 minutes. Remove the pack and wait at least 30 minutes. Repeat for up to 3 days. This helps reduce swelling.    Compression: To reduce swelling and keep the joint stable, you may need to wrap the ankle with an elastic bandage. For more severe sprains, you may need an ankle brace, a boot, or a cast.    Elevation: To reduce swelling, keep your ankle raised above your heart when you sit or lie down.  Medicine  Your healthcare provider may suggest oral nonsteroidal anti-inflammatory medicine (NSAIDs), such as ibuprofen. This relieves the pain and helps reduce swelling. Be sure to take your medicine as directed.  Exercises    After about 2 to 3 weeks, you may be given exercises to strengthen the ligaments and muscles in the ankle. Doing these exercises will help prevent another ankle sprain. Exercises may include standing on your toes and then on your heels and doing ankle curls.  Ankle curls    Sit on the edge of a sturdy table or lie on your back.    Pull your toes toward you. Then point them away from you. Repeat for 2 to 3 minutes.   Date Last Reviewed: 1/1/2018 2000-2018 [employer]. [address] [place] [address]. All rights reserved. This information is not intended as a substitute for professional medical care. Always follow your healthcare professional's instructions.           Patient Education     Understanding Ankle Sprain    The ankle is the joint where the leg and foot meet. Bones are held in place by connective tissue called ligaments. When ankle ligaments are stretched to the point of pain and injury, it  is called an ankle sprain. A sprain can tear the ligaments. These tears can be very small but still cause pain. Ankle sprains can be mild or severe.  What causes an ankle sprain?  A sprain may occur when you twist your ankle or bend it too far. This can happen when you stumble or fall. Things that can make an ankle sprain more likely include:    Having had an ankle sprain before    Playing sports that involve running and jumping. Or playing contact sports such as football or hockey.    Wearing shoes that don t support your feet and ankles well    Having ankles with poor strength and flexibility  Symptoms of an ankle sprain  Symptoms may include:    Pain or soreness in the ankle    Swelling    Redness or bruising    Not being able to walk or put weight on the affected foot    Reduced range of motion in the ankle    A popping or tearing feeling at the time the sprain occurs    An abnormal or dislocated look to the ankle    Instability or too much range of motion in the ankle  Treatment for an ankle sprain  Treatment focuses on reducing pain and swelling, and avoiding further injury. Treatments may include:    Resting the ankle. Avoid putting weight on it. This may mean using crutches until the sprain heals.    Prescription or over-the-counter pain medicines. These help reduce swelling and pain.    Cold packs. These help reduce pain and swelling.    Raising your ankle above your heart. This helps reduce swelling.    Wrapping the ankle with an elastic bandage or ankle brace. This helps reduce swelling and gives some support to the ankle. In rare cases, you may need a cast or boot.    Stretching and other exercises. These improve flexibility and strength.    Heat packs. These may be recommended before doing ankle exercises.  Possible complications of an ankle sprain  An ankle that has been weakened by a sprain can be more likely to have repeated sprains afterward. Doing exercises to strengthen your ankle and improve  balance can reduce your risk for repeated sprains. Other possible complications are long-term (chronic) pain or an ankle that remains unstable.  When to call your healthcare provider  Call your healthcare provider right away if you have any of these:    Fever of 100.4 F (38 C) or higher, or as directed    Pain, numbness, discoloration, or coldness in the foot or toes    Pain that gets worse    Symptoms that don t get better, or get worse    New symptoms   Date Last Reviewed: 3/10/2016    7329-0645 [employer]. [address] [place] [address]. All rights reserved. This information is not intended as a substitute for professional medical care. Always follow your healthcare professional's instructions.

## 2019-04-21 NOTE — PROGRESS NOTES
SUBJECTIVE  Jasen Marshall is a 23 year old male who presents today with left ankle pain that occurred a few hours ago.   The mechanism of injury includes: Patient was playing soccer, catching balls, and ankle rolled inward with immediate pain and swelling of the lateral ankle.   Therapies to improve symptoms include: ice  History of recurrent ankle injuries: no  Pain is not changed since onset.  Aggravating factors: walking, weight-bearing, movement, exertion and flexion/extension, palpation.   Partially relieved by rest.    Patient does not have a history of prior ankle injury or any PMH or FMH of arthritis, osteoporosis, autoimmune conditions, etc. Patient is morbidly obese (40.17 kg/m2).     No past medical history on file.  Current Outpatient Medications   Medication Sig Dispense Refill     lisinopril (PRINIVIL/ZESTRIL) 10 MG tablet Take 10 mg by mouth       metFORMIN (GLUCOPHAGE) 500 MG tablet Take 1 tablet (500 mg) by mouth 2 times daily (with meals) 20 tablet 1     emtricitabine-tenofovir (TRUVADA) 200-300 MG per tablet Take 1 tablet by mouth daily for 28 days 28 tablet 0     HYDROcodone-acetaminophen (NORCO) 5-325 MG per tablet Take 1 tablet by mouth every 6 hours as needed for pain maximum 10 tablet(s) per day (Patient not taking: Reported on 4/20/2019) 12 tablet 0     Ibuprofen (ADVIL PO) Take 400 mg by mouth       raltegravir (ISENTRESS) 400 MG tablet Take 1 tablet (400 mg) by mouth 2 times daily for 28 days 56 tablet 0     UNABLE TO FIND MEDICATION NAME: pepto bismal       Social History     Tobacco Use     Smoking status: Never Smoker     Smokeless tobacco: Never Used   Substance Use Topics     Alcohol use: Yes     Comment: rare       ROS:  10-point review of systems negative except as stated above.    OBJECTIVE:  /60   Pulse 115   Temp 98.2  F (36.8  C) (Oral)   Wt 123.4 kg (272 lb)   SpO2 98%   BMI 40.17 kg/m    EXAM: Patient appears alert,no apparent distress.  Ankle Exam: left     Inspection: contusion and swelling around the lateral malleolus    Palpation: tender over lateral malleolus    Both doralis pedis and posterior tibial pulses intact    Special Maneuvers: Pain with inversion and pain with extension of the left ankle  Neuro:Normal strength and tone, sensory exam grossly normal  Capillary refill 2+ and brisk    X-Ray of the left ankle: Independent read by provider normal mortise, no loose bodies, no fractures seen    Official read below:   Results for orders placed or performed in visit on 04/20/19   XR Ankle Left G/E 3 Views    Narrative    XR ANKLE LT G/E 3 VW 4/20/2019 8:34 PM    COMPARISON: None.    HISTORY: LEFT ankle inversion injury with swelling.      Impression    IMPRESSION: Soft tissue swelling overlying the LEFT lateral malleolus,  but no fractures are seen in the LEFT ankle. Ankle mortise is  congruent and joint spaces are preserved. Mild Achilles enthesopathy.       ASSESSMENT  Inversion left ankle strain  Morbid obesity (BMI 40.17 kg/m2) - worsening    PLAN  Active range of motion exercises encouraged  Ice, elevate, weight bear as tolerated - ok to return to work without limitations.   Ibuprofen 600-800mg po tid prn  Patient provided with Trilock brace and educated on application.     Follow-up if symptoms persist or worsen, or re-injury of the affected ankle.     Follow-up with PCP for weight management.     The above evaluation was completed by Selin Prakash PA-C and written by DANITA Solo student.      I have reviewed the ROS and PSFH documented by the student.  I performed the pertinent history, exam and assessment and plan components as documented above.

## 2024-03-08 ENCOUNTER — OFFICE VISIT (OUTPATIENT)
Dept: URGENT CARE | Facility: URGENT CARE | Age: 29
End: 2024-03-08

## 2024-03-08 VITALS
WEIGHT: 268 LBS | DIASTOLIC BLOOD PRESSURE: 64 MMHG | RESPIRATION RATE: 18 BRPM | TEMPERATURE: 102.7 F | OXYGEN SATURATION: 97 % | BODY MASS INDEX: 39.58 KG/M2 | HEART RATE: 117 BPM | SYSTOLIC BLOOD PRESSURE: 109 MMHG

## 2024-03-08 DIAGNOSIS — R50.9 FEVER IN ADULT: Primary | ICD-10-CM

## 2024-03-08 DIAGNOSIS — R19.7 DIARRHEA, UNSPECIFIED TYPE: ICD-10-CM

## 2024-03-08 LAB
ALBUMIN SERPL-MCNC: 3.7 G/DL (ref 3.4–5)
ALBUMIN UR-MCNC: 100 MG/DL
ALP SERPL-CCNC: 32 U/L (ref 40–150)
ALT SERPL W P-5'-P-CCNC: 33 U/L (ref 0–70)
ANION GAP SERPL CALCULATED.3IONS-SCNC: 6 MMOL/L (ref 3–14)
ANION GAP SERPL CALCULATED.3IONS-SCNC: 6 MMOL/L (ref 3–14)
APPEARANCE UR: CLEAR
AST SERPL W P-5'-P-CCNC: 24 U/L (ref 0–45)
BACTERIA #/AREA URNS HPF: ABNORMAL /HPF
BILIRUB SERPL-MCNC: 0.9 MG/DL (ref 0.2–1.3)
BILIRUB UR QL STRIP: NEGATIVE
BUN SERPL-MCNC: 17 MG/DL (ref 7–30)
BUN SERPL-MCNC: 17 MG/DL (ref 7–30)
CALCIUM SERPL-MCNC: 9.3 MG/DL (ref 8.5–10.1)
CALCIUM SERPL-MCNC: 9.3 MG/DL (ref 8.5–10.1)
CHLORIDE BLD-SCNC: 101 MMOL/L (ref 94–109)
CHLORIDE BLD-SCNC: 101 MMOL/L (ref 94–109)
CO2 SERPL-SCNC: 27 MMOL/L (ref 20–32)
CO2 SERPL-SCNC: 27 MMOL/L (ref 20–32)
COLOR UR AUTO: YELLOW
CREAT SERPL-MCNC: 1.3 MG/DL (ref 0.66–1.25)
CREAT SERPL-MCNC: 1.3 MG/DL (ref 0.66–1.25)
DEPRECATED S PYO AG THROAT QL EIA: NEGATIVE
EGFRCR SERPLBLD CKD-EPI 2021: 77 ML/MIN/1.73M2
EGFRCR SERPLBLD CKD-EPI 2021: 77 ML/MIN/1.73M2
ERYTHROCYTE [DISTWIDTH] IN BLOOD BY AUTOMATED COUNT: 12.2 % (ref 10–15)
FLUAV AG SPEC QL IA: NEGATIVE
FLUBV AG SPEC QL IA: NEGATIVE
GLUCOSE BLD-MCNC: 195 MG/DL (ref 70–99)
GLUCOSE BLD-MCNC: 195 MG/DL (ref 70–99)
GLUCOSE UR STRIP-MCNC: NEGATIVE MG/DL
HCT VFR BLD AUTO: 44.7 % (ref 40–53)
HGB BLD-MCNC: 14.8 G/DL (ref 13.3–17.7)
HGB UR QL STRIP: NEGATIVE
KETONES UR STRIP-MCNC: NEGATIVE MG/DL
LEUKOCYTE ESTERASE UR QL STRIP: NEGATIVE
MCH RBC QN AUTO: 29.8 PG (ref 26.5–33)
MCHC RBC AUTO-ENTMCNC: 33.1 G/DL (ref 31.5–36.5)
MCV RBC AUTO: 90 FL (ref 78–100)
NITRATE UR QL: NEGATIVE
PH UR STRIP: 5.5 [PH] (ref 5–7)
PLATELET # BLD AUTO: 217 10E3/UL (ref 150–450)
POTASSIUM BLD-SCNC: 3.5 MMOL/L (ref 3.4–5.3)
POTASSIUM BLD-SCNC: 3.5 MMOL/L (ref 3.4–5.3)
PROT SERPL-MCNC: 7.2 G/DL (ref 6.8–8.8)
RBC # BLD AUTO: 4.97 10E6/UL (ref 4.4–5.9)
RBC #/AREA URNS AUTO: ABNORMAL /HPF
SODIUM SERPL-SCNC: 134 MMOL/L (ref 135–145)
SODIUM SERPL-SCNC: 134 MMOL/L (ref 135–145)
SP GR UR STRIP: >=1.03 (ref 1–1.03)
SPERM #/AREA URNS HPF: PRESENT /HPF
SQUAMOUS #/AREA URNS AUTO: ABNORMAL /LPF
UROBILINOGEN UR STRIP-ACNC: 0.2 E.U./DL
WBC # BLD AUTO: 11.3 10E3/UL (ref 4–11)
WBC #/AREA URNS AUTO: ABNORMAL /HPF

## 2024-03-08 PROCEDURE — 87635 SARS-COV-2 COVID-19 AMP PRB: CPT | Performed by: PHYSICIAN ASSISTANT

## 2024-03-08 PROCEDURE — 80053 COMPREHEN METABOLIC PANEL: CPT | Performed by: PHYSICIAN ASSISTANT

## 2024-03-08 PROCEDURE — 87804 INFLUENZA ASSAY W/OPTIC: CPT | Performed by: PHYSICIAN ASSISTANT

## 2024-03-08 PROCEDURE — 80048 BASIC METABOLIC PNL TOTAL CA: CPT | Performed by: PHYSICIAN ASSISTANT

## 2024-03-08 PROCEDURE — 85027 COMPLETE CBC AUTOMATED: CPT | Performed by: PHYSICIAN ASSISTANT

## 2024-03-08 PROCEDURE — 36415 COLL VENOUS BLD VENIPUNCTURE: CPT | Performed by: PHYSICIAN ASSISTANT

## 2024-03-08 PROCEDURE — 99204 OFFICE O/P NEW MOD 45 MIN: CPT | Performed by: PHYSICIAN ASSISTANT

## 2024-03-08 PROCEDURE — 81001 URINALYSIS AUTO W/SCOPE: CPT | Performed by: PHYSICIAN ASSISTANT

## 2024-03-08 PROCEDURE — 87651 STREP A DNA AMP PROBE: CPT | Performed by: PHYSICIAN ASSISTANT

## 2024-03-08 RX ORDER — SEMAGLUTIDE 2.68 MG/ML
INJECTION, SOLUTION SUBCUTANEOUS
COMMUNITY

## 2024-03-08 RX ORDER — ATORVASTATIN CALCIUM 40 MG/1
1 TABLET, FILM COATED ORAL AT BEDTIME
COMMUNITY
Start: 2023-02-06

## 2024-03-08 RX ORDER — EMPAGLIFLOZIN 25 MG/1
25 TABLET, FILM COATED ORAL DAILY
COMMUNITY

## 2024-03-08 RX ORDER — ACETAMINOPHEN 500 MG
1000 TABLET ORAL ONCE
Status: COMPLETED | OUTPATIENT
Start: 2024-03-08 | End: 2024-03-08

## 2024-03-08 RX ADMIN — Medication 1000 MG: at 18:19

## 2024-03-08 ASSESSMENT — PAIN SCALES - GENERAL: PAINLEVEL: MODERATE PAIN (4)

## 2024-03-09 LAB — GROUP A STREP BY PCR: NOT DETECTED

## 2024-03-09 PROCEDURE — 87507 IADNA-DNA/RNA PROBE TQ 12-25: CPT | Performed by: PHYSICIAN ASSISTANT

## 2024-03-09 NOTE — PROGRESS NOTES
Assessment & Plan     1. Fever in adult  - acetaminophen (TYLENOL) tablet 1,000 mg  - CBC with platelets; Future  - Basic metabolic panel  (Ca, Cl, CO2, Creat, Gluc, K, Na, BUN); Future  - UA Macroscopic with reflex to Microscopic and Culture - Clinic Collect  - Influenza A/B antigen  - Symptomatic COVID-19 Virus (Coronavirus) by PCR Oropharynx; Future  - Streptococcus A Rapid Screen w/Reflex to PCR - Clinic Collect  - Enteric Bacteria and Virus Panel by ROSENDO Stool; Future  - Symptomatic COVID-19 Virus (Coronavirus) by PCR Oropharynx  - CBC with platelets  - Basic metabolic panel  (Ca, Cl, CO2, Creat, Gluc, K, Na, BUN)  - UA Microscopic with Reflex to Culture  - Group A Streptococcus PCR Throat Swab  - Comprehensive metabolic panel; Future  - Comprehensive metabolic panel; Future  - Comprehensive metabolic panel    2. Diarrhea, unspecified type      Patient presents to clinic for evaluation of diarrhea and fever.  Symptoms started in the past 24 hours.  On examination he is febrile, tachycardic and appears ill.  Blood pressure is stable.  He was given 1 g of Tylenol, which improved his headache, fever reduced and heart rate became more normal.  On examination, lungs are clear to auscultation bilaterally.  He is not complaining of chest pain or shortness of breath to suggest pneumonia as a source.  Abdomen is soft without rebound, guarding or rigidity.   Throat is without PTA, RPA or deep neck space abscess.  TMs clear bilaterally.  He does not have nuchal rigidity.  Neurologically he is completely intact.  Strength and sensation are intact bilaterally.   Influenza testing is negative.  Strep testing is negative.  UA shows some spilling of protein, but is without evidence of urinary tract infection/pyelonephritis.  Creatinine is a little bit elevated at 1.3, I suspect this is due to diarrhea and poor oral intake for the past 24 hours.  I encouraged him to drink small sips of fluids frequently and brat diet.  Return  the stool cultures to the clinic to further evaluate and characterize diarrhea.  Tylenol to aid in fever and bodyaches.  We discussed in detail indications for follow-up including protracted fever for more than 3 days, severe abdominal pain, severe headache, dizziness lightheadedness weakness, vomiting unable to hold down fluids, blood in the stool or in the vomit, stomach gets bigger or bloated etc.  To follow-up in the emergency department.  Patient lives with his sister and dad who will be monitoring him.    I would like to have him follow-up with his PCP next week for a repeat creatinine.      Return in about 2 days (around 3/10/2024), or if symptoms worsen or fail to improve, for ER.    Diagnosis and treatment plan was reviewed with patient and/or family.   We went over any labs or imaging. Discussed worsening symptoms or little to no relief despite treatment plan to follow-up with PCP or return to clinic.  Patient verbalizes understanding. All questions were addressed and answered.     Luli Fletcher PA-C  [facility]    CHIEF COMPLAINT:   Chief Complaint   Patient presents with    Headache     Headache since yesterday. Have been taking advil and day-luis not working.     Diarrhea     Diarrhea and nausea all day today, chills and possible fever.     Subjective     Jasen is a 28 year old male who presents to clinic today for evaluation of headache, nausea and diarrhea.  Symptoms started yesterday.  Fever he thinks started today.  He has been taking ibuprofen for his symptoms.  Last dose of 400 mg was at 1 PM.  He has had more than 10 episodes of diarrhea.  No blood noted in his stool.  No mucus in his stool..  He has not had any episodes of.  He took 1 dose of Imodium, but did not not see any improvement with it.  He denies having a sore throat, runny nose, congestion or cough.  No dysuria or hematuria.  He denies having vision changes or neck pain.  No rash noted.      No past  medical history on file.  No past surgical history on file.  Social History     Tobacco Use    Smoking status: Never    Smokeless tobacco: Never   Substance Use Topics    Alcohol use: Yes     Comment: rare     Current Outpatient Medications   Medication    atorvastatin (LIPITOR) 40 MG tablet    Ibuprofen (ADVIL PO)    JARDIANCE 25 MG TABS tablet    metFORMIN (GLUCOPHAGE) 500 MG tablet    order for DME    OZEMPIC, 2 MG/DOSE, 8 MG/3ML pen    UNABLE TO FIND     No current facility-administered medications for this visit.     No Known Allergies    10 point ROS of systems were all negative except for pertinent positives noted in my HPI.      Exam:   /64   Pulse 117   Temp (!) 102.7  F (39.3  C)   Resp 18   Wt 121.6 kg (268 lb)   SpO2 97%   BMI 39.58 kg/m    Constitutional: Patient appears ill.   Head: Normocephalic, atraumatic.  Eyes: conjunctiva clear, no drainage. PERRL. Conjunctiva clear.   ENT: TMs clear and shiny gordy, nasal mucosa pink and moist, throat without tonsillar hypertrophy or erythema. Mucous membrane slightly dry.   Neck: neck is supple, no cervical lymphadenopathy or nuchal rigidity  Cardiovascular: RRR  Respiratory: CTA bilaterally, no rhonchi or rales  Gastrointestinal: soft and nontender. No rebound, guarding or rigidity.   Skin: no rashes  Neurologic: Speech clear, gait normal. Moves all extremities.    Results for orders placed or performed in visit on 03/08/24   UA Macroscopic with reflex to Microscopic and Culture - Clinic Collect     Status: Abnormal    Specimen: Urine, Clean Catch   Result Value Ref Range    Color Urine Yellow Colorless, Straw, Light Yellow, Yellow    Appearance Urine Clear Clear    Glucose Urine Negative Negative mg/dL    Bilirubin Urine Negative Negative    Ketones Urine Negative Negative mg/dL    Specific Gravity Urine >=1.030 1.003 - 1.035    Blood Urine Negative Negative    pH Urine 5.5 5.0 - 7.0    Protein Albumin Urine 100 (A) Negative mg/dL    Urobilinogen  Urine 0.2 0.2, 1.0 E.U./dL    Nitrite Urine Negative Negative    Leukocyte Esterase Urine Negative Negative   CBC with platelets     Status: Abnormal   Result Value Ref Range    WBC Count 11.3 (H) 4.0 - 11.0 10e3/uL    RBC Count 4.97 4.40 - 5.90 10e6/uL    Hemoglobin 14.8 13.3 - 17.7 g/dL    Hematocrit 44.7 40.0 - 53.0 %    MCV 90 78 - 100 fL    MCH 29.8 26.5 - 33.0 pg    MCHC 33.1 31.5 - 36.5 g/dL    RDW 12.2 10.0 - 15.0 %    Platelet Count 217 150 - 450 10e3/uL   Basic metabolic panel  (Ca, Cl, CO2, Creat, Gluc, K, Na, BUN)     Status: Abnormal   Result Value Ref Range    Sodium 134 (L) 135 - 145 mmol/L    Potassium 3.5 3.4 - 5.3 mmol/L    Chloride 101 94 - 109 mmol/L    Carbon Dioxide (CO2) 27 20 - 32 mmol/L    Anion Gap 6 3 - 14 mmol/L    Urea Nitrogen 17 7 - 30 mg/dL    Creatinine 1.30 (H) 0.66 - 1.25 mg/dL    GFR Estimate 77 >60 mL/min/1.73m2    Calcium 9.3 8.5 - 10.1 mg/dL    Glucose 195 (H) 70 - 99 mg/dL   UA Microscopic with Reflex to Culture     Status: Abnormal   Result Value Ref Range    Bacteria Urine Moderate (A) None Seen /HPF    RBC Urine 0-2 0-2 /HPF /HPF    WBC Urine 0-5 0-5 /HPF /HPF    Squamous Epithelials Urine Few (A) None Seen /LPF    Sperm Urine Present (A) None Seen /HPF    Narrative    Urine Culture not indicated   Comprehensive metabolic panel     Status: Abnormal   Result Value Ref Range    Sodium 134 (L) 135 - 145 mmol/L    Potassium 3.5 3.4 - 5.3 mmol/L    Chloride 101 94 - 109 mmol/L    Carbon Dioxide (CO2) 27 20 - 32 mmol/L    Anion Gap 6 3 - 14 mmol/L    Urea Nitrogen 17 7 - 30 mg/dL    Creatinine 1.30 (H) 0.66 - 1.25 mg/dL    GFR Estimate 77 >60 mL/min/1.73m2    Calcium 9.3 8.5 - 10.1 mg/dL    Glucose 195 (H) 70 - 99 mg/dL    Alkaline Phosphatase 32 (L) 40 - 150 U/L    AST 24 0 - 45 U/L    ALT 33 0 - 70 U/L    Protein Total 7.2 6.8 - 8.8 g/dL    Albumin 3.7 3.4 - 5.0 g/dL    Bilirubin Total 0.9 0.2 - 1.3 mg/dL   Influenza A/B antigen     Status: Normal    Specimen: Nose; Swab    Result Value Ref Range    Influenza A antigen Negative Negative    Influenza B antigen Negative Negative    Narrative    Test results must be correlated with clinical data. If necessary, results should be confirmed by a molecular assay or viral culture.   Streptococcus A Rapid Screen w/Reflex to PCR - Clinic Collect     Status: Normal    Specimen: Throat; Swab   Result Value Ref Range    Group A Strep antigen Negative Negative

## 2024-03-09 NOTE — PATIENT INSTRUCTIONS
Continue with Tylenol for fever  Drink small sips of fluids frequently  BRAT diet (banana, rice, applesauce, toast)    Your creatinine is slightly elevated, I would like you to have this rechecked next week with your PCP    Follow-up in ER if development of:  Severe headache  Severe abdominal pain  Your fever lasts longer than 3 days  You have blood in your stools. This may be bright red or appear as black, tarry stools.   You keep vomiting (throwing up) or cannot drink liquids.  You see blood when you vomit.   You cannot have a bowel movement or you cannot pass gas.  Your stomach gets bloated or bigger.  Your skin or the whites of your eyes look yellow.  You faint.  You have bloody, frequent or painful urination (peeing).  Weakness, dizziness or lightheadedness  You have new symptoms or anything that worries you.

## 2024-03-10 LAB — SARS-COV-2 RNA RESP QL NAA+PROBE: NEGATIVE

## 2024-03-11 ENCOUNTER — TELEPHONE (OUTPATIENT)
Dept: PEDIATRICS | Facility: CLINIC | Age: 29
End: 2024-03-11

## 2024-03-11 DIAGNOSIS — A02.0 SALMONELLA ENTERITIS: Primary | ICD-10-CM

## 2024-03-11 RX ORDER — CIPROFLOXACIN 500 MG/1
500 TABLET, FILM COATED ORAL 2 TIMES DAILY
Qty: 10 TABLET | Refills: 0 | Status: SHIPPED | OUTPATIENT
Start: 2024-03-11 | End: 2024-03-16

## 2024-03-11 NOTE — TELEPHONE ENCOUNTER
Pt calls back to inquire recent lab results. Relayed provider message as seen below:   Yong Carpenter MD  3/10/2024  7:50 PM CDT Back to Top      I left a message for pt to call back  + Salmonella     If he is still having diarrhea/fever should consider treatment with abx   - Cipro typically     Pt reports his fever has resolved, and he is still having loose stools with some improvement in frequency. He reports 5-8 loose stools Saturday. He reports about 5 loose stools in the past 24 hours. He states his preferred pharmacy is the [facility] in [place].    Please review and advise, thanks!  Hema Jennings RN on 3/11/2024 at 9:22 AM

## 2024-03-27 LAB
